# Patient Record
Sex: MALE | Race: WHITE | NOT HISPANIC OR LATINO | Employment: STUDENT | URBAN - METROPOLITAN AREA
[De-identification: names, ages, dates, MRNs, and addresses within clinical notes are randomized per-mention and may not be internally consistent; named-entity substitution may affect disease eponyms.]

---
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
## 2025-01-03 ENCOUNTER — TRANSFERRED RECORDS (OUTPATIENT)
Dept: HEALTH INFORMATION MANAGEMENT | Facility: CLINIC | Age: 20
End: 2025-01-03
Payer: COMMERCIAL

## 2025-01-09 ENCOUNTER — TELEPHONE (OUTPATIENT)
Dept: INTERNAL MEDICINE | Facility: CLINIC | Age: 20
End: 2025-01-09
Payer: COMMERCIAL

## 2025-01-09 NOTE — TELEPHONE ENCOUNTER
----- Message from Shakir Fuentes sent at 1/9/2025  1:55 PM CST -----  Regarding: RE: reschedule  They requested this - I'd like it to be a 40 min appt - ok to use any of my hold spots to schedule him  ----- Message -----  From: Karla Luis  Sent: 1/9/2025   1:40 PM CST  To: Shakir Fuentes MD  Subject: FW: reschedule                                   When they ask why, what should we tell them?  ----- Message -----  From: Shakir Fuentes MD  Sent: 1/9/2025   1:08 PM CST  To: [facility]  Subject: reschedule                                       Please call patient's mother Candice and navin Felix reschedule his appointment which is scheduled for Friday currently.  Thank you!

## 2025-01-16 ENCOUNTER — OFFICE VISIT (OUTPATIENT)
Dept: INTERNAL MEDICINE | Facility: CLINIC | Age: 20
End: 2025-01-16
Payer: COMMERCIAL

## 2025-01-16 VITALS
RESPIRATION RATE: 16 BRPM | HEIGHT: 76 IN | OXYGEN SATURATION: 96 % | WEIGHT: 276.2 LBS | TEMPERATURE: 97.7 F | BODY MASS INDEX: 33.63 KG/M2 | HEART RATE: 84 BPM | DIASTOLIC BLOOD PRESSURE: 74 MMHG | SYSTOLIC BLOOD PRESSURE: 110 MMHG

## 2025-01-16 DIAGNOSIS — K76.0 FATTY LIVER: ICD-10-CM

## 2025-01-16 DIAGNOSIS — E78.1 HYPERTRIGLYCERIDEMIA: ICD-10-CM

## 2025-01-16 DIAGNOSIS — R41.840 INATTENTION: Primary | ICD-10-CM

## 2025-01-16 ASSESSMENT — ASTHMA QUESTIONNAIRES
QUESTION_4 LAST FOUR WEEKS HOW OFTEN HAVE YOU USED YOUR RESCUE INHALER OR NEBULIZER MEDICATION (SUCH AS ALBUTEROL): ONCE A WEEK OR LESS
QUESTION_5 LAST FOUR WEEKS HOW WOULD YOU RATE YOUR ASTHMA CONTROL: COMPLETELY CONTROLLED
QUESTION_1 LAST FOUR WEEKS HOW MUCH OF THE TIME DID YOUR ASTHMA KEEP YOU FROM GETTING AS MUCH DONE AT WORK, SCHOOL OR AT HOME: NONE OF THE TIME
ACT_TOTALSCORE: 23
QUESTION_2 LAST FOUR WEEKS HOW OFTEN HAVE YOU HAD SHORTNESS OF BREATH: ONCE OR TWICE A WEEK
ACT_TOTALSCORE: 23
QUESTION_3 LAST FOUR WEEKS HOW OFTEN DID YOUR ASTHMA SYMPTOMS (WHEEZING, COUGHING, SHORTNESS OF BREATH, CHEST TIGHTNESS OR PAIN) WAKE YOU UP AT NIGHT OR EARLIER THAN USUAL IN THE MORNING: NOT AT ALL

## 2025-01-16 ASSESSMENT — PAIN SCALES - GENERAL: PAINLEVEL_OUTOF10: NO PAIN (0)

## 2025-01-16 NOTE — PROGRESS NOTES
"  Assessment & Plan     Inattention  - Adult Mental Health  Referral; Future    Hypertriglyceridemia  Discussed fatty liver and high triglycerides and dietary modification.  He is planning to lose 20lbs and play defensive end.  I'm recommending low carbohydrate diet and low saturated fat diet.  We also discussed minimizing or avoiding alcohol.  We will need to monitor his LFTs and triglycerides periodically to show improvement.  He is also following with Dr. Crawford.     Fatty liver  As above    BMI  Estimated body mass index is 33.62 kg/m  as calculated from the following:    Height as of this encounter: 1.93 m (6' 4\").    Weight as of this encounter: 125.3 kg (276 lb 3.2 oz).       Mahamed Redding is a 19 year old, presenting for the following health issues:  OFFICE VISIT and Recheck Medication (PT IS HERE WITH HIS MOTHER TO DISCUSS AND FOLLOW OF HEALTH CONCERNS AND TO DISCUSS POSSIBLE ADHD EVALUATION.)      1/16/2025     3:34 PM   Additional Questions   Roomed by MORALES   Accompanied by MOM         1/16/2025     3:34 PM   Patient Reported Additional Medications   Patient reports taking the following new medications NONE     History of Present Illness       Reason for visit:  Liver    He eats 2-3 servings of fruits and vegetables daily.He consumes 1 sweetened beverage(s) daily.He exercises with enough effort to increase his heart rate 60 or more minutes per day.  He exercises with enough effort to increase his heart rate 5 days per week.          Objective    /74 (BP Location: Right arm, Patient Position: Sitting, Cuff Size: Adult Large)   Pulse 84   Temp 97.7  F (36.5  C) (Tympanic)   Resp 16   Ht 1.93 m (6' 4\")   Wt 125.3 kg (276 lb 3.2 oz)   SpO2 96%   BMI 33.62 kg/m    Body mass index is 33.62 kg/m .  Physical Exam       Signed Electronically by: Shakir Fuentes MD      The longitudinal plan of care for the diagnosis(es)/condition(s) as documented were addressed during this visit. Due " to the added complexity in care, I will continue to support Vahid in the subsequent management and with ongoing continuity of care.    TT 30 min including chart review, history, counseling and documentation

## 2025-02-05 ENCOUNTER — OFFICE VISIT (OUTPATIENT)
Dept: INTERNAL MEDICINE | Facility: CLINIC | Age: 20
End: 2025-02-05
Payer: COMMERCIAL

## 2025-02-05 VITALS
SYSTOLIC BLOOD PRESSURE: 132 MMHG | HEIGHT: 76 IN | RESPIRATION RATE: 14 BRPM | BODY MASS INDEX: 32.87 KG/M2 | WEIGHT: 269.9 LBS | TEMPERATURE: 97.5 F | DIASTOLIC BLOOD PRESSURE: 84 MMHG | OXYGEN SATURATION: 98 % | HEART RATE: 68 BPM

## 2025-02-05 DIAGNOSIS — J45.20 MILD INTERMITTENT ASTHMA WITHOUT COMPLICATION: ICD-10-CM

## 2025-02-05 DIAGNOSIS — Z00.00 ANNUAL PHYSICAL EXAM: Primary | ICD-10-CM

## 2025-02-05 DIAGNOSIS — K76.0 FATTY LIVER: ICD-10-CM

## 2025-02-05 DIAGNOSIS — E78.1 HYPERTRIGLYCERIDEMIA: ICD-10-CM

## 2025-02-05 PROCEDURE — 99213 OFFICE O/P EST LOW 20 MIN: CPT | Mod: 25 | Performed by: INTERNAL MEDICINE

## 2025-02-05 PROCEDURE — 99395 PREV VISIT EST AGE 18-39: CPT | Performed by: INTERNAL MEDICINE

## 2025-02-05 RX ORDER — ALBUTEROL SULFATE 90 UG/1
2 INHALANT RESPIRATORY (INHALATION) EVERY 6 HOURS PRN
Qty: 18 G | Refills: 11 | Status: SHIPPED | OUTPATIENT
Start: 2025-02-05

## 2025-02-05 SDOH — HEALTH STABILITY: PHYSICAL HEALTH: ON AVERAGE, HOW MANY DAYS PER WEEK DO YOU ENGAGE IN MODERATE TO STRENUOUS EXERCISE (LIKE A BRISK WALK)?: 5 DAYS

## 2025-02-05 ASSESSMENT — SOCIAL DETERMINANTS OF HEALTH (SDOH): HOW OFTEN DO YOU GET TOGETHER WITH FRIENDS OR RELATIVES?: MORE THAN THREE TIMES A WEEK

## 2025-02-05 NOTE — PROGRESS NOTES
Office Visit - Physical   Vahid Terrazas   19 year old  male    Date of visit: 2/5/2025  Physician: Shakir Fuentes MD     Assessment and Plan   1. Annual physical exam (Primary)  This is a 19 year old man seen for an annual physical and sport physical.  He has no contraindication or restriction to college sports participation.  See additional details below.    2. Mild intermittent asthma without complication  Well controlled, continue albuterol as needed before and/or during sports practice and exercise  - albuterol (PROAIR HFA/PROVENTIL HFA/VENTOLIN HFA) 108 (90 Base) MCG/ACT inhaler; Inhale 2 puffs into the lungs every 6 hours as needed for shortness of breath, wheezing or cough.  Dispense: 18 g; Refill: 11    3. Fatty liver  I have recommend a low carbohydrate diet.  Specific details on maintaining weight and strength while limiting carbohydrates has been given to Max.  Additionally, I have recommended he meet with the dietician at [facility].  - Comprehensive metabolic panel; Future  - TSH; Future    4. Hypertriglyceridemia  As above  - Lipid panel reflex to direct LDL Fasting; Future    Return in about 4 weeks (around 3/5/2025) for Follow up labs.     Chief Complaint   Chief Complaint   Patient presents with    Annual Visit        Patient Profile   Social History     Social History Narrative    Freshman, [employer], football.  Two brothers, Harsh and Prabhakar.          Past Medical History   Patient Active Problem List   Diagnosis    Mild intermittent asthma without complication       Past Surgical History  He has a past surgical history that includes No Past Surgeries.     History of Present Illness   This 19 year old man comes in for an annual physical.     Review of Systems: A comprehensive review of systems was negative except as noted.     Medications and Allergies   Current Outpatient Medications   Medication Sig Dispense Refill    albuterol (PROAIR HFA/PROVENTIL  "HFA/VENTOLIN HFA) 108 (90 Base) MCG/ACT inhaler Inhale 2 puffs into the lungs every 6 hours as needed for shortness of breath, wheezing or cough. 18 g 11     Allergies   Allergen Reactions    Amoxicillin Other (See Comments)     Pt states he \"got very sick\"        Family and Social History   Family History   Problem Relation Age of Onset    No Known Problems Mother     Coronary Artery Disease Father 44    Diabetes Type 2  Father     Cardiac Sudden Death Father     No Known Problems Brother     No Known Problems Brother         Social History     Tobacco Use    Smoking status: Never    Smokeless tobacco: Never   Vaping Use    Vaping status: Never Used   Substance Use Topics    Alcohol use: Never    Drug use: Never        Physical Exam   General Appearance:   NAD    /84 (BP Location: Left arm, Patient Position: Sitting, Cuff Size: Adult Regular)   Pulse 68   Temp 97.5  F (36.4  C) (Tympanic)   Resp 14   Ht 1.918 m (6' 3.5\")   Wt 122.4 kg (269 lb 14.4 oz)   SpO2 98%   BMI 33.29 kg/m      EYES: Eyelids, conjunctiva, and sclera were normal.  HEAD, EARS, NOSE, MOUTH, AND THROAT: Head and face were normal. Hearing was normal to voice and the ears were normal to external exam. Nose appearance was normal and there was no discharge.   NECK: Neck appearance was normal. There were no neck masses and the thyroid was not enlarged.  RESPIRATORY: Breathing pattern was normal and the chest moved symmetrically.    Lung sounds were normal and there were no abnormal sounds.  CARDIOVASCULAR: Heart rate and rhythm were normal.  S1 and S2 were normal and there were no extra sounds or murmurs.  There was no peripheral edema.  GASTROINTESTINAL: The abdomen was normal in contour.  Bowel sounds were present.  Percussion detected no organ enlargement or tenderness.  Palpation detected no tenderness, mass, or enlarged organs.   MUSCULOSKELETAL: Skeletal configuration was normal and muscle mass was normal for age. Joint appearance " was overall normal.  LYMPHATIC: There were no enlarged nodes.  SKIN/HAIR/NAILS: Skin color was normal.  There were no skin lesions.  Hair and nails were normal.  NEUROLOGIC: The patient was alert and oriented to person, place, time, and circumstance. Speech was normal. Cranial nerves were normal. Motor strength was normal for age. The patient was normally coordinated.  PSYCHIATRIC:  Mood and affect were normal and the patient had normal recent and remote memory. The patient's judgment and insight were normal     Additional Information   The longitudinal plan of care for the diagnosis(es)/condition(s) as documented were addressed during this visit. Due to the added complexity in care, I will continue to support Vahid in the subsequent management and with ongoing continuity of care.       Shakir Fuentes MD  Internal Medicine  Contact me at [phone]

## 2025-02-11 ENCOUNTER — ALLIED HEALTH/NURSE VISIT (OUTPATIENT)
Dept: FAMILY MEDICINE | Facility: CLINIC | Age: 20
End: 2025-02-11
Payer: COMMERCIAL

## 2025-02-11 DIAGNOSIS — Z02.5 SPORTS PHYSICAL: Primary | ICD-10-CM

## 2025-02-11 DIAGNOSIS — Z82.41 FAMILY HISTORY OF SUDDEN CARDIAC DEATH: ICD-10-CM

## 2025-02-11 DIAGNOSIS — Z02.5 SPORTS PHYSICAL: ICD-10-CM

## 2025-02-11 PROCEDURE — 93010 ELECTROCARDIOGRAM REPORT: CPT | Performed by: INTERNAL MEDICINE

## 2025-02-11 PROCEDURE — 93005 ELECTROCARDIOGRAM TRACING: CPT

## 2025-02-12 LAB
ATRIAL RATE - MUSE: 63 BPM
DIASTOLIC BLOOD PRESSURE - MUSE: NORMAL MMHG
INTERPRETATION ECG - MUSE: NORMAL
P AXIS - MUSE: 50 DEGREES
PR INTERVAL - MUSE: 176 MS
QRS DURATION - MUSE: 98 MS
QT - MUSE: 376 MS
QTC - MUSE: 384 MS
R AXIS - MUSE: 44 DEGREES
SYSTOLIC BLOOD PRESSURE - MUSE: NORMAL MMHG
T AXIS - MUSE: 11 DEGREES
VENTRICULAR RATE- MUSE: 63 BPM